# Patient Record
Sex: FEMALE | Race: WHITE | Employment: UNEMPLOYED | URBAN - METROPOLITAN AREA
[De-identification: names, ages, dates, MRNs, and addresses within clinical notes are randomized per-mention and may not be internally consistent; named-entity substitution may affect disease eponyms.]

---

## 2022-12-04 ENCOUNTER — APPOINTMENT (OUTPATIENT)
Dept: CT IMAGING | Age: 57
End: 2022-12-04
Payer: MEDICAID

## 2022-12-04 ENCOUNTER — HOSPITAL ENCOUNTER (EMERGENCY)
Age: 57
Discharge: HOME | End: 2022-12-04
Attending: EMERGENCY MEDICINE
Payer: MEDICAID

## 2022-12-04 VITALS
WEIGHT: 190 LBS | OXYGEN SATURATION: 96 % | DIASTOLIC BLOOD PRESSURE: 80 MMHG | BODY MASS INDEX: 29.82 KG/M2 | TEMPERATURE: 97.9 F | RESPIRATION RATE: 16 BRPM | HEIGHT: 67 IN | SYSTOLIC BLOOD PRESSURE: 147 MMHG | HEART RATE: 71 BPM

## 2022-12-04 DIAGNOSIS — Z85.3 HISTORY OF BREAST CANCER: ICD-10-CM

## 2022-12-04 DIAGNOSIS — S21.009A BREAST WOUND, INITIAL ENCOUNTER: Primary | ICD-10-CM

## 2022-12-04 LAB
ALBUMIN SERPL-MCNC: 4.1 G/DL (ref 3.5–5.2)
ALP BLD-CCNC: 94 U/L (ref 35–104)
ALT SERPL-CCNC: 14 U/L (ref 0–32)
ANION GAP SERPL CALCULATED.3IONS-SCNC: 13 MMOL/L (ref 7–16)
AST SERPL-CCNC: 20 U/L (ref 0–31)
BASOPHILS ABSOLUTE: 0.05 E9/L (ref 0–0.2)
BASOPHILS RELATIVE PERCENT: 0.3 % (ref 0–2)
BILIRUB SERPL-MCNC: 0.5 MG/DL (ref 0–1.2)
BUN BLDV-MCNC: 16 MG/DL (ref 6–20)
CALCIUM SERPL-MCNC: 9.2 MG/DL (ref 8.6–10.2)
CHLORIDE BLD-SCNC: 103 MMOL/L (ref 98–107)
CO2: 23 MMOL/L (ref 22–29)
CREAT SERPL-MCNC: 0.7 MG/DL (ref 0.5–1)
EOSINOPHILS ABSOLUTE: 0.04 E9/L (ref 0.05–0.5)
EOSINOPHILS RELATIVE PERCENT: 0.2 % (ref 0–6)
GFR SERPL CREATININE-BSD FRML MDRD: >60 ML/MIN/1.73
GLUCOSE BLD-MCNC: 114 MG/DL (ref 74–99)
HCT VFR BLD CALC: 45.1 % (ref 34–48)
HEMOGLOBIN: 14.6 G/DL (ref 11.5–15.5)
IMMATURE GRANULOCYTES #: 0.08 E9/L
IMMATURE GRANULOCYTES %: 0.5 % (ref 0–5)
LACTIC ACID, SEPSIS: 1.5 MMOL/L (ref 0.5–1.9)
LYMPHOCYTES ABSOLUTE: 2.32 E9/L (ref 1.5–4)
LYMPHOCYTES RELATIVE PERCENT: 14.4 % (ref 20–42)
MCH RBC QN AUTO: 31.3 PG (ref 26–35)
MCHC RBC AUTO-ENTMCNC: 32.4 % (ref 32–34.5)
MCV RBC AUTO: 96.6 FL (ref 80–99.9)
MONOCYTES ABSOLUTE: 0.7 E9/L (ref 0.1–0.95)
MONOCYTES RELATIVE PERCENT: 4.3 % (ref 2–12)
NEUTROPHILS ABSOLUTE: 12.96 E9/L (ref 1.8–7.3)
NEUTROPHILS RELATIVE PERCENT: 80.3 % (ref 43–80)
PDW BLD-RTO: 12.7 FL (ref 11.5–15)
PLATELET # BLD: 309 E9/L (ref 130–450)
PMV BLD AUTO: 10 FL (ref 7–12)
POTASSIUM SERPL-SCNC: 3.8 MMOL/L (ref 3.5–5)
RBC # BLD: 4.67 E12/L (ref 3.5–5.5)
SODIUM BLD-SCNC: 139 MMOL/L (ref 132–146)
TOTAL CK: 278 U/L (ref 20–180)
TOTAL PROTEIN: 7.4 G/DL (ref 6.4–8.3)
WBC # BLD: 16.2 E9/L (ref 4.5–11.5)

## 2022-12-04 PROCEDURE — 87077 CULTURE AEROBIC IDENTIFY: CPT

## 2022-12-04 PROCEDURE — 87186 SC STD MICRODIL/AGAR DIL: CPT

## 2022-12-04 PROCEDURE — 85025 COMPLETE CBC W/AUTO DIFF WBC: CPT

## 2022-12-04 PROCEDURE — 83605 ASSAY OF LACTIC ACID: CPT

## 2022-12-04 PROCEDURE — 36415 COLL VENOUS BLD VENIPUNCTURE: CPT

## 2022-12-04 PROCEDURE — 82550 ASSAY OF CK (CPK): CPT

## 2022-12-04 PROCEDURE — 6370000000 HC RX 637 (ALT 250 FOR IP): Performed by: EMERGENCY MEDICINE

## 2022-12-04 PROCEDURE — 87040 BLOOD CULTURE FOR BACTERIA: CPT

## 2022-12-04 PROCEDURE — 80053 COMPREHEN METABOLIC PANEL: CPT

## 2022-12-04 PROCEDURE — 87070 CULTURE OTHR SPECIMN AEROBIC: CPT

## 2022-12-04 PROCEDURE — 6360000004 HC RX CONTRAST MEDICATION: Performed by: RADIOLOGY

## 2022-12-04 PROCEDURE — 71260 CT THORAX DX C+: CPT

## 2022-12-04 PROCEDURE — 99285 EMERGENCY DEPT VISIT HI MDM: CPT

## 2022-12-04 RX ORDER — OXYCODONE HYDROCHLORIDE AND ACETAMINOPHEN 5; 325 MG/1; MG/1
1 TABLET ORAL ONCE
Status: COMPLETED | OUTPATIENT
Start: 2022-12-04 | End: 2022-12-04

## 2022-12-04 RX ORDER — KETOROLAC TROMETHAMINE 10 MG/1
10 TABLET, FILM COATED ORAL EVERY 6 HOURS PRN
Qty: 20 TABLET | Refills: 0 | Status: SHIPPED | OUTPATIENT
Start: 2022-12-04 | End: 2022-12-09

## 2022-12-04 RX ORDER — AMOXICILLIN AND CLAVULANATE POTASSIUM 875; 125 MG/1; MG/1
1 TABLET, FILM COATED ORAL 2 TIMES DAILY
Qty: 14 TABLET | Refills: 0 | Status: SHIPPED | OUTPATIENT
Start: 2022-12-04 | End: 2022-12-11

## 2022-12-04 RX ADMIN — IOPAMIDOL 75 ML: 755 INJECTION, SOLUTION INTRAVENOUS at 14:28

## 2022-12-04 RX ADMIN — OXYCODONE AND ACETAMINOPHEN 1 TABLET: 5; 325 TABLET ORAL at 12:43

## 2022-12-04 ASSESSMENT — ENCOUNTER SYMPTOMS
CHEST TIGHTNESS: 0
COLOR CHANGE: 1
SHORTNESS OF BREATH: 0

## 2022-12-04 ASSESSMENT — PAIN SCALES - GENERAL: PAINLEVEL_OUTOF10: 5

## 2022-12-04 NOTE — ED PROVIDER NOTES
82-year-old female presenting for a wound on the right breast.  She has a history of known cancer from several years ago. Initially had surgery [facility] 5 to 6 years ago. She never followed up with any oncologist she reports. About 1 year ago she started to feel another growth in that same area where she previously had surgery. She originally thought it was scar tissue and then 7 months ago noticed that there was a distinct growth occurring again. She still never sought treatment or evaluation. She normally gets care in [facility] where she has been living. Last night, she was arrested. She reports that as she was getting arrested that part of the tissue that was overgrowing the wound or mass on her right breast got pulled off during this process. It bled at that time but is no longer bleeding now. She has a distinct ulcerative growth along a wound approximately 5 x 5 cm with a depth of approximately 5 cm into her right breast.  This is a recurrent problem, persistent, moderate severity, associate with her history of cancer     No family history on file. No past surgical history on file. Review of Systems   Constitutional:  Negative for chills and fever. Respiratory:  Negative for chest tightness and shortness of breath. Cardiovascular:  Negative for chest pain. Skin:  Positive for color change and wound. All other systems reviewed and are negative. Physical Exam  Constitutional:       General: She is not in acute distress. Appearance: She is well-developed. HENT:      Head: Normocephalic and atraumatic. Eyes:      Conjunctiva/sclera: Conjunctivae normal.      Pupils: Pupils are equal, round, and reactive to light. Neck:      Thyroid: No thyromegaly. Cardiovascular:      Rate and Rhythm: Normal rate and regular rhythm. Pulmonary:      Effort: Pulmonary effort is normal. No respiratory distress. Breath sounds: Normal breath sounds.    Abdominal:      General: There is no distension. Palpations: Abdomen is soft. Tenderness: There is no abdominal tenderness. There is no guarding or rebound. Musculoskeletal:         General: No tenderness. Normal range of motion. Cervical back: Normal range of motion. Skin:     General: Skin is warm and dry. Findings: No erythema. Neurological:      Mental Status: She is alert and oriented to person, place, and time. Cranial Nerves: No cranial nerve deficit. Coordination: Coordination normal.                  Procedures     [facility]       ED Course as of 12/05/22 0040   Sun Dec 04, 2022   1645 Patient has a significant history of heroin abuse, suicide attempt with opiates and subsequent Suboxone usage. She understands to follow-up with the surgical team, the wound care center, the oncologist as well. She is asked to have local physicians instead of going back to [facility] where she used to live. She is encouraged to follow-up and get the care that she needs as these are concerning findings consistent with her known cancer. [SO]   1267 Spoke with Dr. Bella Vasquez, he will see this patient in follow-up, recommends wound care in the meantime. She will also be given oncology follow-up [SO]      ED Course User Index  [SO] Lj Vences       ED Course as of 12/05/22 0040   Sun Dec 04, 2022   1645 Patient has a significant history of heroin abuse, suicide attempt with opiates and subsequent Suboxone usage. She understands to follow-up with the surgical team, the wound care center, the oncologist as well. She is asked to have local physicians instead of going back to [facility] where she used to live. She is encouraged to follow-up and get the care that she needs as these are concerning findings consistent with her known cancer. [SO]   9579 Spoke with Dr. Bella Vasquez, he will see this patient in follow-up, recommends wound care in the meantime.   She will also be given oncology follow-up [SO]      ED Course User Index  [SO] Jasen Lynch, DO       --------------------------------------------- PAST HISTORY ---------------------------------------------  Past Medical History:  has no past medical history on file. Past Surgical History:  has no past surgical history on file. Social History:      Family History: family history is not on file. The patients home medications have been reviewed.     Allergies: Morphine    -------------------------------------------------- RESULTS -------------------------------------------------  Labs:  Results for orders placed or performed during the hospital encounter of 12/04/22   CBC with Auto Differential   Result Value Ref Range    WBC 16.2 (H) 4.5 - 11.5 E9/L    RBC 4.67 3.50 - 5.50 E12/L    Hemoglobin 14.6 11.5 - 15.5 g/dL    Hematocrit 45.1 34.0 - 48.0 %    MCV 96.6 80.0 - 99.9 fL    MCH 31.3 26.0 - 35.0 pg    MCHC 32.4 32.0 - 34.5 %    RDW 12.7 11.5 - 15.0 fL    Platelets 932 045 - 795 E9/L    MPV 10.0 7.0 - 12.0 fL    Neutrophils % 80.3 (H) 43.0 - 80.0 %    Immature Granulocytes % 0.5 0.0 - 5.0 %    Lymphocytes % 14.4 (L) 20.0 - 42.0 %    Monocytes % 4.3 2.0 - 12.0 %    Eosinophils % 0.2 0.0 - 6.0 %    Basophils % 0.3 0.0 - 2.0 %    Neutrophils Absolute 12.96 (H) 1.80 - 7.30 E9/L    Immature Granulocytes # 0.08 E9/L    Lymphocytes Absolute 2.32 1.50 - 4.00 E9/L    Monocytes Absolute 0.70 0.10 - 0.95 E9/L    Eosinophils Absolute 0.04 (L) 0.05 - 0.50 E9/L    Basophils Absolute 0.05 0.00 - 0.20 E9/L   Comprehensive Metabolic Panel   Result Value Ref Range    Sodium 139 132 - 146 mmol/L    Potassium 3.8 3.5 - 5.0 mmol/L    Chloride 103 98 - 107 mmol/L    CO2 23 22 - 29 mmol/L    Anion Gap 13 7 - 16 mmol/L    Glucose 114 (H) 74 - 99 mg/dL    BUN 16 6 - 20 mg/dL    Creatinine 0.7 0.5 - 1.0 mg/dL    Est, Glom Filt Rate >60 >=60 mL/min/1.73    Calcium 9.2 8.6 - 10.2 mg/dL    Total Protein 7.4 6.4 - 8.3 g/dL    Albumin 4.1 3.5 - 5.2 g/dL    Total Bilirubin 0.5 0.0 - 1.2 mg/dL    Alkaline Phosphatase 94 35 - 104 U/L    ALT 14 0 - 32 U/L    AST 20 0 - 31 U/L   CK   Result Value Ref Range    Total  (H) 20 - 180 U/L   Lactate, Sepsis   Result Value Ref Range    Lactic Acid, Sepsis 1.5 0.5 - 1.9 mmol/L       Radiology:  CT CHEST W CONTRAST   Final Result   1. Right breast 4 x 3 cm fungating, ulcerative type lesion which is gas   filled. Please see details above. No lymphadenopathy identified. 2.  Centrilobular emphysema. No metastatic lung disease or acute chest   disease.             ------------------------- NURSING NOTES AND VITALS REVIEWED ---------------------------  Date / Time Roomed:  12/4/2022 11:49 AM  ED Bed Assignment:  12/12    The nursing notes within the ED encounter and vital signs as below have been reviewed. BP (!) 147/80   Pulse 71   Temp 97.9 °F (36.6 °C) (Infrared)   Resp 16   Ht 5' 7\" (1.702 m)   Wt 190 lb (86.2 kg)   SpO2 96%   BMI 29.76 kg/m²   Oxygen Saturation Interpretation: Normal      ------------------------------------------ PROGRESS NOTES ------------------------------------------  I have spoken with the patient and discussed todays results, in addition to providing specific details for the plan of care and counseling regarding the diagnosis and prognosis. Their questions are answered at this time and they are agreeable with the plan. I discussed at length with them reasons for immediate return here for re evaluation. They will followup with primary care by calling their office tomorrow. --------------------------------- ADDITIONAL PROVIDER NOTES ---------------------------------  At this time the patient is without objective evidence of an acute process requiring hospitalization or inpatient management. They have remained hemodynamically stable throughout their entire ED visit and are stable for discharge with outpatient follow-up.      The plan has been discussed in detail and they are aware of the specific conditions for emergent return, as well as the importance of follow-up. Discharge Medication List as of 12/4/2022  5:00 PM        START taking these medications    Details   amoxicillin-clavulanate (AUGMENTIN) 875-125 MG per tablet Take 1 tablet by mouth 2 times daily for 7 days, Disp-14 tablet, R-0Normal      ketorolac (TORADOL) 10 MG tablet Take 1 tablet by mouth every 6 hours as needed for Pain, Disp-20 tablet, R-0Normal             Diagnosis:  1. Breast wound, initial encounter    2. History of breast cancer        Disposition:  Patient's disposition: Discharge to home  Patient's condition is stable.          Isaias Ferguson DO  12/05/22 0041

## 2022-12-04 NOTE — ED NOTES
Iv established and labs drawn/sent, medicated per orders, no periods since 41years of age, to have ct after labs     Genevieve Oh RN  12/04/22 9837

## 2022-12-04 NOTE — ED TRIAGE NOTES
Department of Emergency Medicine  FIRST PROVIDER TRIAGE NOTE             Independent MLP           12/4/22  11:43 AM EST    Date of Encounter: 12/4/22   MRN: [id number]      HPI: Cadence Ferrell is a 56 y.o. female who presents to the ED for Wound Check (Pt has tumor on her right breast that she states is open. Pt states she had a dressing on her wound that was ripped off last night causing it to open and bleed.)       ROS: Negative for cp, sob, abd pain, back pain, fever, cough, vomiting, diarrhea, urinary complaints, headache, or dizziness. PE: Gen Appearance/Constitutional: alert  CV: regular rate  Pulm: CTA bilat  GI: soft and NT     Initial Plan of Care: All treatment areas with department are currently occupied. Plan to order/Initiate the following while awaiting opening in ED: labs and imaging studies.   Initiate Treatment-Testing, Proceed toTreatment Area When Bed Available for ED Attending/MLP to Continue Care    Electronically signed by RHONDA Cabrera   DD: 12/4/22

## 2022-12-08 LAB
ORGANISM: ABNORMAL
WOUND/ABSCESS: ABNORMAL
WOUND/ABSCESS: ABNORMAL

## 2022-12-09 LAB
BLOOD CULTURE, ROUTINE: NORMAL
CULTURE, BLOOD 2: NORMAL

## 2022-12-09 RX ORDER — CIPROFLOXACIN 500 MG/1
500 TABLET, FILM COATED ORAL 2 TIMES DAILY
Qty: 14 TABLET | Refills: 0 | Status: SHIPPED | OUTPATIENT
Start: 2022-12-09 | End: 2022-12-16

## 2022-12-09 NOTE — PROGRESS NOTES
Reviewed patients after hours culture results. Wound culture growing Pseudomonas, patient discharged on Augmentin.      Discussed with Dr. Jessica Mares and spoke with patient - Rx for ciprofloxacin sent to [facility] in [place] per patient's request.    Brett Lares, FadumoD, BCPS 12/9/2022 1:19 PM   [phone]

## 2023-11-12 ENCOUNTER — HOSPITAL ENCOUNTER (EMERGENCY)
Age: 58
Discharge: HOME | End: 2023-11-12
Attending: EMERGENCY MEDICINE
Payer: MEDICAID

## 2023-11-12 VITALS
BODY MASS INDEX: 29.82 KG/M2 | HEART RATE: 92 BPM | DIASTOLIC BLOOD PRESSURE: 72 MMHG | RESPIRATION RATE: 18 BRPM | OXYGEN SATURATION: 98 % | WEIGHT: 190 LBS | SYSTOLIC BLOOD PRESSURE: 123 MMHG | TEMPERATURE: 98 F | HEIGHT: 67 IN

## 2023-11-12 DIAGNOSIS — Z51.89 VISIT FOR WOUND CHECK: Primary | ICD-10-CM

## 2023-11-12 PROCEDURE — 99283 EMERGENCY DEPT VISIT LOW MDM: CPT

## 2023-11-12 ASSESSMENT — PAIN - FUNCTIONAL ASSESSMENT: PAIN_FUNCTIONAL_ASSESSMENT: NONE - DENIES PAIN

## 2023-11-12 ASSESSMENT — LIFESTYLE VARIABLES
HOW OFTEN DO YOU HAVE A DRINK CONTAINING ALCOHOL: NEVER
HOW MANY STANDARD DRINKS CONTAINING ALCOHOL DO YOU HAVE ON A TYPICAL DAY: PATIENT DOES NOT DRINK

## 2023-11-12 NOTE — ED PROVIDER NOTES
[address]        Pt Name: Nii Zamudio  MRN: [id number]  [address] [facility] [place] 1965  Date of evaluation: 11/12/2023  Provider: Yolanda Anglin DO  PCP: No primary care provider on file. Note Started: 1:16 AM EST 11/12/23    CHIEF COMPLAINT       Chief Complaint   Patient presents with    Wound Infection     Pt states that she was at the halfway and they didn't want to keep her due to a wound on her right breast she states is from her CA. Pt states 6 days ago she was dx with blood clots in her lungs and given eliquis that she has been taking. HISTORY OF PRESENT ILLNESS: 1 or more Elements   History From: Patient      Nii Zamudio is a 66 y.o. female who presents to the emergency department for wound check. Patient states that she follows with oncology for breast cancer. Patient scheduled for mastectomy on the 21st.  Patient is currently on Cipro and Flagyl for her wound infection. Patient states the wound has been there for a year. Patient states that she was picked up for a warrant tonight when they wanted her to come to the emergency department for a wound and to have her medications. Patient was diagnosed with a PE recently and is on Eliquis. Patient denies any chest pain at this time. Patient denies any fever, chills, nausea, vomiting, chest pain, abdominal pain, leg pain, leg swelling  Patient confirms chronic wound   Review of Systems   Constitutional:  Negative for chills and fever. HENT:  Negative for congestion and sore throat. Respiratory:  Negative for cough and shortness of breath. Cardiovascular:  Negative for chest pain. Gastrointestinal:  Negative for constipation, diarrhea, nausea and vomiting. Genitourinary:  Negative for dysuria. Musculoskeletal:  Negative for back pain. Skin:  Positive for wound (Chronic). Neurological:  Negative for headaches.          Nursing Notes were all reviewed and

## 2023-11-12 NOTE — DISCHARGE INSTRUCTIONS
Please continue antibiotics as prescribed. Please return the emergency department for develop any new or worsening symptoms.

## 2023-11-12 NOTE — ED NOTES
Pt given d/c instructions and was able to verbalize understanding. Pt ambulatory out of department.       Dandre Kidd RN  11/12/23 5380

## 2023-11-13 ASSESSMENT — ENCOUNTER SYMPTOMS
VOMITING: 0
SORE THROAT: 0
CONSTIPATION: 0
SHORTNESS OF BREATH: 0
NAUSEA: 0
COUGH: 0
DIARRHEA: 0
BACK PAIN: 0

## 2024-04-30 ENCOUNTER — OFFICE VISIT (OUTPATIENT)
Dept: HEMATOLOGY/ONCOLOGY | Facility: HOSPITAL | Age: 59
End: 2024-04-30
Payer: MEDICAID

## 2024-04-30 VITALS
RESPIRATION RATE: 20 BRPM | OXYGEN SATURATION: 98 % | WEIGHT: 172.62 LBS | SYSTOLIC BLOOD PRESSURE: 142 MMHG | HEIGHT: 66 IN | TEMPERATURE: 97.3 F | BODY MASS INDEX: 27.74 KG/M2 | HEART RATE: 99 BPM | DIASTOLIC BLOOD PRESSURE: 83 MMHG

## 2024-04-30 DIAGNOSIS — C50.919 MALIGNANT NEOPLASM OF BREAST IN FEMALE, ESTROGEN RECEPTOR NEGATIVE, UNSPECIFIED LATERALITY, UNSPECIFIED SITE OF BREAST (MULTI): Primary | ICD-10-CM

## 2024-04-30 DIAGNOSIS — Z17.1 MALIGNANT NEOPLASM OF BREAST IN FEMALE, ESTROGEN RECEPTOR NEGATIVE, UNSPECIFIED LATERALITY, UNSPECIFIED SITE OF BREAST (MULTI): Primary | ICD-10-CM

## 2024-04-30 PROBLEM — C50.811 MALIGNANT NEOPLASM OF OVERLAPPING SITES OF RIGHT BREAST IN FEMALE, ESTROGEN RECEPTOR NEGATIVE (MULTI): Status: ACTIVE | Noted: 2024-04-30

## 2024-04-30 PROCEDURE — 99215 OFFICE O/P EST HI 40 MIN: CPT | Performed by: INTERNAL MEDICINE

## 2024-04-30 PROCEDURE — 99205 OFFICE O/P NEW HI 60 MIN: CPT | Performed by: INTERNAL MEDICINE

## 2024-04-30 ASSESSMENT — PAIN SCALES - GENERAL: PAINLEVEL: 6

## 2024-04-30 NOTE — PROGRESS NOTES
Patient Visit Information:   Date of Service: 4/30/2024   Referring Provider:  Dr. Bartolome Marie   Visit Type: New Visit      Cancer History:          Breast         AJCC Edition: 8th (AJCC), Diagnosis Date: 5/25/2022               Stage IIIC (cT4b, cN0, cM0, G3, ER-, OK-, HER2-)       Oncologic History and Treatment Synopsis:      59 y.o. postmenopausal  female with right-sided invasive ductal carcinoma, PDL-1 Negative Triple Negative, clinical stage IIIC (cT4b cN0 M0).  The patient's breast cancer was diagnosed on May 25, 2023, and is grade 3, estrogen receptor negative at <1%, progesterone receptor negative at 1%, and HER-2/sumi negative.    Patient was previously diagnosed and treated for ispilateral right sided her-positive breast cancer, stage IIA (T2N0M0).     Details of her oncologic history are below:      ONCOLOGIC HISTORY:  Malignant neoplasm of overlapping sites of right breast in female, estrogen receptor negative (Multi), Clinical: Stage IIIC (cT4b, cN0, cM0, G3, ER-, OK-, HER2-)  PDL-1 Negative with CPS Score =0    10/9/2017: Patient underwent a a right US-guided core biopsy. Pathology was consistent with invasive ductal carcinoma ER/OK negative Her2 undetermined  10/16/2017: Patient underwent a right partial mastectomy with SLNB. Pathology was consistent with invasive ductal carcinoma, grade 3 with invasive ductal carcinoma extending focally to the anterior inked  margin, DCIS was present, high grade, solid and comedo types, 0/2 lymph nodes negative, estrogen receptor <1%, progesterone receptor 1% and her2 (3+), stage pT2N0.  Received TCH x 6 but did not complete maintenance Herceptin   Received 13/33 planned radiation treatments  4/2022: Patient presented with right breast mass. Had apparently been present for several months prior  5/25/2022: Patient underwent a core needle biopsy of right breast. This revealed invasive, ductal carcinoma , ER/OK/HER2 negative  Developed ulceration (8 x  "10 x 4cm) in right breast  03/07/2023: PET scan showed FDG uptake (SUV 14.5) in open wound, diffuse edema of right breast, no abnormal axillary or supraclavicular adenopathy,   3/15/2023: Initiated on Carbo/Taxol D1, D8, D15 with D22 off per 28 Day cycle. Last dose received 11/21/2023 11/4/2023: Patient underwent CT of the CAP. This revealed bilateral pulmonary emboli. There was a large necrotic mass in the right breast with skin thickening and inflammatory changes worse than on prior PET scan of 03/07/2023. There was fatty infiltration of the liver  12/28/2023: Patient was initiated on Carboplatin and Gemcitabine, D1, D8 with D15 off.   02/03/2024: PDL-1 test resulted with CPS Score =0  3/27/2024: Bilateral MRI of Breast. Per report this revealed a 3.5 cm large necrotic mass with large open wound. Thickened enhancing skin mos likely  with malignant infiltration and retracted right pectoralis muscle  with malignant extension. There were a few abnormal 1-2 cm right axillary Lns and an abnormal right internal mammary lymph node. Left breast did not reveal any evidence of malignancy     History of Present Illness: Patient ID: Mariela De Guzman \"Mona\" is a 59 y.o. female.        Chief Complaint: Here for to seek surgical consultation for locally advanced right sided TNBC.      Interval History:    Ms. Mariela De Guzman is a 59 y.o. postmenopausal  female with right-sided invasive ductal carcinoma, Triple Negative, clinical stage IIIC (cT4b cN0 M0).  The patient's breast cancer was diagnosed on May 25, 2023, and is grade 3, estrogen receptor negative at <1%, progesterone receptor negative at 1%, and HER-2/sumi negative, PDL-1 negative.    Patient was previously diagnosed and treated for ispilateral right sided her2-positive breast cancer, stage IIA (T2N0M0).      Details of her oncologic history are detailed above. She is currently under the care of Dr. Bartolome Marie, [facility],  and is currently " undergoing neoadjuvant chemotherapy with Carboplatin and Gemcitabine, initiated on 12/28/2023 and last recieved about 3-4 weeks ago.    She comes in looking for a surgical consultation. Per patient, in her estimation, she has responded well to treatment and she would like to proceed with surgery. This is consistent with oncologist's assessment in medical records which states has had a good response with 50-60% improvement in breast mass after initiation of Carbo Ray with no evidence of distant metastatic disease.      Per her medical records,  in discussion with a local surgical oncologist surgical options were weighed and after much deliberation  including tumor board discussion a referral to  for surgical management was thought to be the most appropriate given muscle involvement.     Patient reports pain in right breast. Per accompanying medical records patients oncologist recommended referral to palliative care with concern of medication abuse due to negative testing for oxycodone in urine despite prescriptions.       Review of Systems:       A 14-point review of system was completed and was negative except for what is noted in HPI.        Allergies and Intolerances:       Allergies:   Allergies   Allergen Reactions    Morphine Anaphylaxis                 Outpatient Medication Profile:     Eliquis 5 mg BID  Ocycodone 10 po q4 prn     Medical History:  Pulmonary Emboli, breast cancer  Surgical History:  Right partial mastectomy        Family History:  See below    Social Substance History:   Social History     Socioeconomic History    Marital status: Significant Other     Spouse name: Not on file    Number of children: Not on file    Years of education: Not on file    Highest education level: Not on file   Occupational History    Not on file   Tobacco Use    Smoking status: Every Day     Average packs/day: 1 pack/day for 39.0 years (39.0 ttl pk-yrs)     Types: Cigarettes     Start date: 1985     Passive  "exposure: Current    Smokeless tobacco: Never   Substance and Sexual Activity    Alcohol use: Not on file    Drug use: Not on file    Sexual activity: Not on file   Other Topics Concern    Not on file   Social History Narrative    Not on file     Social Determinants of Health     Financial Resource Strain: Not on file   Food Insecurity: Not on file   Transportation Needs: Not on file   Physical Activity: Not on file   Stress: Not on file   Social Connections: Not on file   Intimate Partner Violence: Not on file   Housing Stability: Not on file      Additional Social History:     Breast Cancer Risk Factors:     Family History:  Mother had breast cancer at the age of 40, maternal grandmother at the age of 40 and paternal grandmother at the age of 60 years. Per records patient reported being a BRCA carrier but testing had been done with results pending.     OBJECTIVE:    VS / Pain:  /83   Pulse 99   Temp 36.3 °C (97.3 °F) (Temporal)   Resp 20   Ht (S) 1.67 m (5' 5.75\")   Wt 78.3 kg (172 lb 9.9 oz)   SpO2 98%   BMI 28.08 kg/m²   BSA: 1.91 meters squared   Pain Scale: 3    The ECOG performance scale today is Asymptomatic    Physical Exam:      Constitutional: Well developed, awake/alert/oriented  x3, no distress, alert and cooperative   Eyes: PERRL, EOMI, clear sclera   ENMT: mucous membranes moist, no apparent injury,  no lesions seen   Head/Neck: Neck supple, no apparent injury, thyroid  without mass or tenderness, No JVD, trachea midline, no bruits   Respiratory/Thorax: Patent airways, CTAB, normal  breath sounds with good chest expansion, thorax symmetric   Cardiovascular: Regular, rate and rhythm, no murmurs,  2+ equal pulses of the extremities, normal S 1and S 2   Gastrointestinal: Nondistended, soft, non-tender,  no rebound tenderness or guarding, no masses palpable, no organomegaly, +BS, no bruits   Musculoskeletal: ROM intact, no joint swelling, normal  strength   Extremities: Left upper extremity " with hyperpigmentation  tracking along a vein starting in antecubital fossa   Neurological: alert and oriented x3, intact senses,  motor, response and reflexes, normal strength   Breast: Large ulcerative lesion with rolled edges measuring approximately 7 x 3 cm in right breast.  No palpable mass in the left breast. No palpable axillary or supraclavicular lymphadenopathies bilaterally. No swelling of either upper extremity which had free range of motion.   Lymphatic: No significant lymphadenopathy   Psychological: Appropriate mood and behavior   Skin: Warm and dry, no lesions, no rashes                 Assessment and Plan:   Assessment  Ms. Mariela De Guzman is a 59 y.o. postmenopausal  female with right-sided invasive ductal carcinoma, Triple Negative, clinical stage IIIC (cT4b cN0 M0).  The patient's breast cancer was diagnosed on May 25, 2023, and is grade 3, estrogen receptor negative at <1%, progesterone receptor negative at 1%, and HER-2/sumi negative, PDL-1 negative.    Patient was previously diagnosed and treated for ispilateral right sided her2-positive breast cancer, stage IIA (T2N0M0).     She is currently under the care of Dr. Bartolome Marie, [facility],  and is currently undergoing neoadjuvant chemotherapy with Carboplatin and Gemcitabine, initiated on 12/28/2023 and last recieved about 3-4 weeks ago.    She comes in looking for a surgical consultation.     Plan    Refer surgical Oncology. Patient was briefly discussed with Dr. Jennyfer BRIAN who will see patient castro week  Obtain additional Medical records to include recent scans and path slides for review  RTC after surgical consult for additional medical oncology recommendations

## 2024-05-06 ENCOUNTER — OFFICE VISIT (OUTPATIENT)
Dept: SURGICAL ONCOLOGY | Facility: CLINIC | Age: 59
End: 2024-05-06
Payer: MEDICAID

## 2024-05-06 VITALS
SYSTOLIC BLOOD PRESSURE: 138 MMHG | DIASTOLIC BLOOD PRESSURE: 83 MMHG | HEART RATE: 85 BPM | BODY MASS INDEX: 28.63 KG/M2 | WEIGHT: 176 LBS

## 2024-05-06 DIAGNOSIS — Z17.1 MALIGNANT NEOPLASM OF BREAST IN FEMALE, ESTROGEN RECEPTOR NEGATIVE, UNSPECIFIED LATERALITY, UNSPECIFIED SITE OF BREAST (MULTI): ICD-10-CM

## 2024-05-06 DIAGNOSIS — C50.919 MALIGNANT NEOPLASM OF BREAST IN FEMALE, ESTROGEN RECEPTOR NEGATIVE, UNSPECIFIED LATERALITY, UNSPECIFIED SITE OF BREAST (MULTI): ICD-10-CM

## 2024-05-06 PROCEDURE — 99215 OFFICE O/P EST HI 40 MIN: CPT | Performed by: SURGERY

## 2024-05-06 PROCEDURE — 99205 OFFICE O/P NEW HI 60 MIN: CPT | Performed by: SURGERY

## 2024-05-06 RX ORDER — METRONIDAZOLE 500 MG/1
500 TABLET ORAL EVERY 8 HOURS
COMMUNITY
Start: 2024-01-11

## 2024-05-06 ASSESSMENT — PAIN SCALES - GENERAL: PAINLEVEL: 9

## 2024-05-06 NOTE — PROGRESS NOTES
Chief Complaint  Locally advanced, recurrent right breast cancer    History of Present Illness    Referring Provider: Jabari    59 year-old non-Ashkenazi Amish,  female here with locally advanced recurrent right breast cancer, triple neg  cT4 cNx   Here alone       History per chart. No images available:  1) 10/2017 right breast bx. IDC Er/MD neg. Her2 ?  2) 10/2017 right PM/SLNB ([place]) pT2 pN0 IDC. + anterior margin 0/2 Lns. ER <1% MD 1% Her2 positive  3) TCH x 2 but did not complete herceptin x 1 year  4) some XRT ( treatments)  5) 2022 right breast mass x a few months  6) 2022 right breast bx ([place]) IDC ER/MD/Her2 neg  7) wound ulceration 8 x 10 x 4 cm   8) 3/2023 PET scan abnormal uptake in a large right breast mass.  No distant disease  9) 3/2023 started on carbo/taxol.    10) 2023 diagnosed with PE on CT scan. Worsening right breast mass  11) changed to carbo/gemcitabine  12) 3/29/2024.  Bilateral breast MRI.  Left breast and axilla negative.  Right breast 3.5 cm necrotic appearing right breast mass with large open wound.  Thickened enhancing skin most consistent with malignant involvement.  Retracted right pectoralis muscle with malignant extension.  Right axilla with a few abnormal right axillary lymph nodes.  Abnormal 5 mm right internal mammary lymph node.  BI-RADS 6  13) pt reports negative genetic testing  14) med onc consultation with Dr Barboza    She presents today for further management and surgical discussion.    Ob/gyn history:  Menarche 14  Menopause 43  , age of first delivery 27  10 years OCPs, no fertility treatments, no HRT    No family history of breast or ovarian cancer.     Review of systems  A comprehensive ROS was taken on the patient intake form and reviewed with the patient. This form is scanned into the electronic medical record.    Constitutional symptoms: Denies generalized fatigue. Denies weight change, fevers/chills, difficulty sleeping   Eyes:  Denies double vision, glaucoma, cataracts.  Ear/nose/throat/mouth: Denies hearing changes, sore throat, sinus problems.  Cardiovascular: No chest pain. Denies irregular heartbeat. Denies ankle swelling.  Respiratory: No wheezing, cough, or shortness of breath.  Gastrointestinal: No abdominal pain, No nausea/vomiting. No indigestion/heartburn. No change in bowel habits. No constipation or diarrhea.   Genitourinary: No urinary incontinence. No urinary frequency. No painful urination.  Musculoskeletal: No bone pain, no muscle pain, no joint pain.   Integumentary: No rash. No masses. No changes in moles. No easy bruising.  Neurological: No headaches. No tremors. No numbness/tingling.  Psychiatric: No anxiety. No depression.  Endocrine: No excessive thirst. Not too hot or too cold. Not tired or fatigued.   Hematological/lymphatic: No swollen glands or blood clotting problems. No bruising.     Physical exam  A chaperone was offered for all portions of the physical exam. The patient declined.     General appearance: appears stated age, alert and oriented x 3  Head: Normocephalic, atraumatic  Eyes: conjunctivae/corneas clear.  Ears: External ears are normal, hearing is grossly intact.  Lungs: normal breathing  Heart: regular   Abdomen: Soft, nontender, nondistended.  Neurologic: grossly normal  Lymph nodes: No cervical, supraclavicular or axillary lymphadenopathy bilaterally    Breast: A comprehensive breast exam was performed in the seated and supine positions. Breasts are symmetrical. Bilateral nipples are everted. There are no skin changes on arm maneuvers. Bra size: B   Right: photo uploaded.  Just superior and lateral to the nipple there is a 6 x 4 cm open wound.  This area is fixed to the chest wall .     Left: no masses     Results  Imaging  All breast imaging personally reviewed by me.  See HPI    Pathology  5/25/2022 right breast bx ([place]) IDC ER/NC/Her2 neg    Impression:   1) 59 year-old non-Ashkenazi [employer],   female here with locally advanced recurrent right breast cancer, triple neg  2) Co-morbidities include pulmonary emboli but not on blood thinners. H/o heroin use. Current tobacco use  3) FH of breast cancer. Pt reports negative genetic testing      Plan:   She is here alone.  Her course has been complicated and her medical records are incomplete.  It sounds like she has a locally recurrent locally advanced right breast cancer, triple negative with good response to systemic therapy.  Recent breast MRI suggests involvement of the muscle.  Exam today confirms a fungating area on her right breast that appears fixed to the chest wall.  There may be additional benefit to continued systemic therapy and optimizing surgical resection.  Surgical plan would be right modified radical mastectomy without reconstruction.  She did not complete her initial radiation treatment so there could be additional radiation recommended after surgery.  I will review with Dr. Brennen Mcfadden.  Will continue to obtain medical records.  She might need repeat imaging prior to surgery.  She should call with any sooner concerns.

## 2024-05-08 ENCOUNTER — APPOINTMENT (OUTPATIENT)
Dept: HEMATOLOGY/ONCOLOGY | Facility: HOSPITAL | Age: 59
End: 2024-05-08
Payer: MEDICAID

## 2024-05-08 DIAGNOSIS — C50.919 MALIGNANT NEOPLASM OF BREAST IN FEMALE, ESTROGEN RECEPTOR NEGATIVE, UNSPECIFIED LATERALITY, UNSPECIFIED SITE OF BREAST (MULTI): ICD-10-CM

## 2024-05-08 DIAGNOSIS — Z17.1 MALIGNANT NEOPLASM OF BREAST IN FEMALE, ESTROGEN RECEPTOR NEGATIVE, UNSPECIFIED LATERALITY, UNSPECIFIED SITE OF BREAST (MULTI): ICD-10-CM

## 2024-05-16 ENCOUNTER — HOSPITAL ENCOUNTER (OUTPATIENT)
Dept: RADIOLOGY | Facility: EXTERNAL LOCATION | Age: 59
Discharge: HOME | End: 2024-05-16
Payer: MEDICAID

## 2024-05-21 ENCOUNTER — APPOINTMENT (OUTPATIENT)
Dept: HEMATOLOGY/ONCOLOGY | Facility: HOSPITAL | Age: 59
End: 2024-05-21
Payer: MEDICAID

## 2024-05-21 ENCOUNTER — TELEPHONE (OUTPATIENT)
Dept: SCHEDULING | Age: 59
End: 2024-05-21
Payer: MEDICAID

## 2024-05-21 NOTE — TELEPHONE ENCOUNTER
I called patient to inform her that her appt has been rescheduled to 9 am tomorrow 5/22 per her request. Patient's VM was full - unable to leave message. Steffi Waters RN

## 2024-05-21 NOTE — TELEPHONE ENCOUNTER
Pt is hoping to be seen tomorrow around 9 or 9:30 pm instead of today. I see those slots are saved for NPVs, is it okay to use for FUV? Thank you!

## 2024-05-22 ENCOUNTER — APPOINTMENT (OUTPATIENT)
Dept: HEMATOLOGY/ONCOLOGY | Facility: HOSPITAL | Age: 59
End: 2024-05-22
Payer: MEDICAID

## 2024-05-28 ENCOUNTER — HOSPITAL ENCOUNTER (OUTPATIENT)
Dept: RADIOLOGY | Facility: EXTERNAL LOCATION | Age: 59
Discharge: HOME | End: 2024-05-28

## 2024-05-28 ENCOUNTER — OFFICE VISIT (OUTPATIENT)
Dept: HEMATOLOGY/ONCOLOGY | Facility: HOSPITAL | Age: 59
End: 2024-05-28
Payer: MEDICAID

## 2024-05-28 VITALS
TEMPERATURE: 97.2 F | RESPIRATION RATE: 20 BRPM | SYSTOLIC BLOOD PRESSURE: 149 MMHG | HEART RATE: 78 BPM | WEIGHT: 178.57 LBS | DIASTOLIC BLOOD PRESSURE: 85 MMHG | BODY MASS INDEX: 28.7 KG/M2 | HEIGHT: 66 IN | OXYGEN SATURATION: 98 %

## 2024-05-28 DIAGNOSIS — Z17.1 MALIGNANT NEOPLASM OF OVERLAPPING SITES OF RIGHT BREAST IN FEMALE, ESTROGEN RECEPTOR NEGATIVE (MULTI): Primary | ICD-10-CM

## 2024-05-28 DIAGNOSIS — C50.919 MALIGNANT NEOPLASM OF BREAST IN FEMALE, ESTROGEN RECEPTOR NEGATIVE, UNSPECIFIED LATERALITY, UNSPECIFIED SITE OF BREAST (MULTI): ICD-10-CM

## 2024-05-28 DIAGNOSIS — C50.811 MALIGNANT NEOPLASM OF OVERLAPPING SITES OF RIGHT BREAST IN FEMALE, ESTROGEN RECEPTOR NEGATIVE (MULTI): Primary | ICD-10-CM

## 2024-05-28 DIAGNOSIS — Z17.1 MALIGNANT NEOPLASM OF BREAST IN FEMALE, ESTROGEN RECEPTOR NEGATIVE, UNSPECIFIED LATERALITY, UNSPECIFIED SITE OF BREAST (MULTI): ICD-10-CM

## 2024-05-28 PROCEDURE — 99215 OFFICE O/P EST HI 40 MIN: CPT | Performed by: INTERNAL MEDICINE

## 2024-05-28 RX ORDER — CIPROFLOXACIN 500 MG/1
500 TABLET ORAL 3 TIMES DAILY
COMMUNITY

## 2024-05-28 RX ORDER — ONDANSETRON HYDROCHLORIDE 8 MG/1
8 TABLET, FILM COATED ORAL EVERY 8 HOURS PRN
Qty: 30 TABLET | Refills: 5 | Status: SHIPPED | OUTPATIENT
Start: 2024-05-28

## 2024-05-28 RX ORDER — PROCHLORPERAZINE MALEATE 10 MG
10 TABLET ORAL EVERY 6 HOURS PRN
Qty: 30 TABLET | Refills: 5 | Status: SHIPPED | OUTPATIENT
Start: 2024-05-28

## 2024-05-28 ASSESSMENT — PAIN SCALES - GENERAL: PAINLEVEL: 8

## 2024-05-28 NOTE — PROGRESS NOTES
Expand All Collapse All    Patient Visit Information:   Date of Service: 4/30/2024   Referring Provider:  Dr. Bartolome Marie   Visit Type: New Visit      Cancer History:          Breast         AJCC Edition: 8th (AJCC), Diagnosis Date: 5/25/2022               Stage IIIC (cT4b, cN0, cM0, G3, ER-, WV-, HER2-)         Oncologic History and Treatment Synopsis:       59 y.o. postmenopausal  female with right-sided invasive ductal carcinoma, PDL-1 Negative Triple Negative, clinical stage IIIC (cT4b cN0 M0).  The patient's breast cancer was diagnosed on May 25, 2023, and is grade 3, estrogen receptor negative at <1%, progesterone receptor negative at 1%, and HER-2/sumi negative.     Patient was previously diagnosed and treated for ispilateral right sided her-positive breast cancer, stage IIA (T2N0M0).      Details of her oncologic history are below:      ONCOLOGIC HISTORY:  Malignant neoplasm of overlapping sites of right breast in female, estrogen receptor negative ([place]), Clinical: Stage IIIC (cT4b, cN0, cM0, G3, ER-, WV-, HER2-)  PDL-1 Negative with CPS Score =0     10/9/2017: Patient underwent a a right US-guided core biopsy. Pathology was consistent with invasive ductal carcinoma ER/WV negative Her2 undetermined  10/16/2017: Patient underwent a right partial mastectomy with SLNB. Pathology was consistent with invasive ductal carcinoma, grade 3 with invasive ductal carcinoma extending focally to the anterior inked  margin, DCIS was present, high grade, solid and comedo types, 0/2 lymph nodes negative, estrogen receptor <1%, progesterone receptor 1% and her2 (3+), stage pT2N0.  Received TCH x 6 but did not complete maintenance Herceptin   Received 13/33 planned radiation treatments  4/2022: Patient presented with right breast mass. Had apparently been present for several months prior  5/25/2022: Patient underwent a core needle biopsy of right breast. This revealed invasive, ductal carcinoma , ER/WV/HER2  "negative  Developed ulceration (8 x 10 x 4cm) in right breast  03/07/2023: PET scan showed FDG uptake (SUV 14.5) in open wound, diffuse edema of right breast, no abnormal axillary or supraclavicular adenopathy,   3/15/2023: Initiated on Carbo/Taxol D1, D8, D15 with D22 off per 28 Day cycle. Last dose received 11/21/2023 11/4/2023: Patient underwent CT of the CAP. This revealed bilateral pulmonary emboli. There was a large necrotic mass in the right breast with skin thickening and inflammatory changes worse than on prior PET scan of 03/07/2023. There was fatty infiltration of the liver  11/21/2023: Last dose of Carbo/Taxol  12/28/2023: Patient was initiated on Carboplatin and Gemcitabine, D1, D8 with D15 off.   02/03/2024: PDL-1 test resulted with CPS Score =0  2/22/2024: PET scan showed abnirmal uptake in a large mass in the lateral aspect of the right breast with extension into the overlying muscle (SUV 3.1; Previously SUV was 14.3). No abnormal supraclavicular or axillary Lymph nodes  3/27/2024: Bilateral MRI of Breast. Per report this revealed a 3.5 cm large necrotic mass with large open wound. Thickened enhancing skin most likely  with malignant infiltration and retracted right pectoralis muscle  with malignant extension. There were a few abnormal 1-2 cm right axillary Lns and an abnormal right internal mammary lymph node. Left breast did not reveal any evidence of malignancy     History of Present Illness: Patient ID: Mariela De Guzman \"Mona\" is a 59 y.o. female.        Chief Complaint: Here to finalize her treatment plan for locally advanced right sided TNBC with a large fungating ulcer      Interval History:    Ms. Mariela De Guzman is a 59 y.o. postmenopausal  female with locally advanced right-sided invasive ductal carcinoma with a fungating ulcer, Triple Negative in nature, clinical stage IIIC (cT4b cN0 M0).  The patient's breast cancer was diagnosed on May 25, 2023, and is grade 3, estrogen receptor " negative at <1%, progesterone receptor negative at 1%, and HER-2/sumi negative, PDL-1 negative.     She was previously  under the care of Dr. Bartolome Marie, [facility],  and was undergoing neoadjuvant chemotherapy with Carboplatin and Gemcitabine, initiated on 12/28/2023 and, last received March 29, 2024.    She was first seen on 4/30/2024 for transfer of care. She requested a surgical consultation and was seen by Dr. Kc who recommended continued neoadjuvant chemo as patient was not resectable at this time.     Patient comes in today to finalize her neoajuavant treatment plan. She reports that she has begun to notice an increase in size in ulceration, oozing, discharge, pain and redness. She is currently taling Cephalexin 500mh po q8h and fl;agyl 500mg po q8h for the past 3 days with improvement in symptoms. These are abx prescribed by her previous oncologist that previously worked.      She denies any fever, chills or rigors.       Review of Systems:         A 14-point review of system was completed and was negative except for what is noted in HPI.        Allergies and Intolerances:       Allergies:        Allergies   Allergen Reactions    Morphine Anaphylaxis                 Outpatient Medication Profile:      Eliquis 5 mg BID  Ocycodone 10 po q4 prn     Medical History:  Pulmonary Emboli, breast cancer  Surgical History:  Right partial mastectomy        Family History:  See below     Social Substance History:   Social History               Socioeconomic History    Marital status: Significant Other       Spouse name: Not on file    Number of children: Not on file    Years of education: Not on file    Highest education level: Not on file   Occupational History    Not on file   Tobacco Use    Smoking status: Every Day       Average packs/day: 1 pack/day for 39.0 years (39.0 ttl pk-yrs)       Types: Cigarettes       Start date: 1985       Passive exposure: Current    Smokeless tobacco: Never  "  Substance and Sexual Activity    Alcohol use: Not on file    Drug use: Not on file    Sexual activity: Not on file   Other Topics Concern    Not on file   Social History Narrative    Not on file      Social Determinants of Health      Financial Resource Strain: Not on file   Food Insecurity: Not on file   Transportation Needs: Not on file   Physical Activity: Not on file   Stress: Not on file   Social Connections: Not on file   Intimate Partner Violence: Not on file   Housing Stability: Not on file         Additional Social History:     Breast Cancer Risk Factors:     Family History:  Mother had breast cancer at the age of 40, maternal grandmother at the age of 40 and paternal grandmother at the age of 60 years. Per records patient reported being a BRCA carrier but testing had been done with results pending.      OBJECTIVE:     Visit Vitals  /85   Pulse 78   Temp 36.2 °C (97.2 °F) (Temporal)   Resp 20   Ht 1.67 m (5' 5.75\")   Wt 81 kg (178 lb 9.2 oz)   SpO2 98%   BMI 29.04 kg/m²   Smoking Status Every Day   BSA 1.94 m²       Pain Scale: 3     The ECOG performance scale today is Asymptomatic     Physical Exam:      Constitutional: Well developed, awake/alert/oriented  x3, no distress, alert and cooperative   Eyes: PERRL, EOMI, clear sclera   ENMT: mucous membranes moist, no apparent injury,  no lesions seen   Head/Neck: Neck supple, no apparent injury, thyroid  without mass or tenderness, No JVD, trachea midline, no bruits   Respiratory/Thorax: Patent airways, CTAB, normal  breath sounds with good chest expansion, thorax symmetric   Cardiovascular: Regular, rate and rhythm, no murmurs,  2+ equal pulses of the extremities, normal S 1and S 2   Gastrointestinal: Nondistended, soft, non-tender,  no rebound tenderness or guarding, no masses palpable, no organomegaly, +BS, no bruits   Musculoskeletal: ROM intact, no joint swelling, normal  strength   Extremities: Left upper extremity with hyperpigmentation  " tracking along a vein starting in antecubital fossa   Neurological: alert and oriented x3, intact senses,  motor, response and reflexes, normal strength   Breast: Large ulcerative lesion with rolled edges measuring approximately 7 x 3 cm in right breast, no foul smelling, with erythema of rolled edges and mostly clear discharge. No palpable mass in the left breast. No palpable axillary or supraclavicular lymphadenopathies bilaterally. No swelling of either upper extremity which had free range of motion.   Lymphatic: No significant lymphadenopathy   Psychological: Appropriate mood and behavior   Skin: Warm and dry, no lesions, no rashes                      Assessment and Plan:   Assessment  Ms. Mariela De Guzman is a 59 y.o. postmenopausal  female with right-sided invasive ductal carcinoma, Triple Negative, clinical stage IIIC (cT4b cN0 M0).  The patient's breast cancer was diagnosed on May 25, 2023, and is grade 3, estrogen receptor negative at <1%, progesterone receptor negative at 1%, and HER-2/sumi negative, PDL-1 negative.     Patient was previously diagnosed and treated for ispilateral right sided her2-positive breast cancer, stage IIA (T2N0M0).      She was previously  under the care of Dr. Bartolome Marie, [facility],  and was undergoing neoadjuvant chemotherapy with Carboplatin and Gemcitabine, initiated on 12/28/2023 and, last received March 29, 2024.    She was first seen on 4/30/2024 for transfer of care. She requested a surgical consultation and was seen by Dr. Kc who recommended continued neoadjuvant chemo as patient was not resectable at this time.     Patient comes in today to finalize her neoajuavant treatment plan. She reports that she has begun to notice an increase in size in ulceration, oozing, discharge, pain and redness. She is currently taling Cephalexin 500mh po q8h and fl;agyl 500mg po q8h for the past 3 days with improvement in symptoms. These are abx prescribed by  her previous oncologist that previously worked.      She denies any fever, chills or rigors.      I recommend she resume Carbo [place] given that she had a favorable treatment response. I will obtain restaging to establish a new baseline prior to treatment.    Side effects that were discussed included but were not limited to myelosuppression, febrile neutropenia, nausea, vomiting diarrhea, dehydration,  acute hypersensitivity reaction, alopecia, peripheral neuropathy, liver and kidney toxicity, cardiotoxicity,  other malignancies and peripheral edema.     After much deliberation patient agreed to proceed with treatment. Informed consent was signed.            Plan     Resume neoadjuvant chemotherapy with Carboplatin AUC of 2 and Gemcitabine 1000mg/m2 , D1, D8 with D15 off every 21 days  CBC with diff, CMP and Pregnancy test, PT INR  Medi-port placement  Continue Cephalexin 500mg PO 8H and Flagyl 500mg PO 8H for CW ulceration. Patient declined wound care stating it did not help in the past  Palliative care in local hospital  Smoking cessation recommended. Patient declined  PET scan on 6/12  RTC 6/12 for first cycle of Carbo gem

## 2024-05-28 NOTE — PATIENT INSTRUCTIONS
Please stop and get your labs done TODAY.  Continue taking your antibiotics and call if symptoms worsen.  Please schedule your Port placement.  Please schedule your PET scan  Please schedule a follow up visit with us in 2 weeks.    Continue to follow up with palliative care at your [facility] hospital.    Please call us at [phone] option [phone] then option 2 with any questions or concerns.

## 2024-05-29 ENCOUNTER — TELEPHONE (OUTPATIENT)
Dept: HEMATOLOGY/ONCOLOGY | Facility: HOSPITAL | Age: 59
End: 2024-05-29
Payer: MEDICAID

## 2024-05-29 NOTE — TELEPHONE ENCOUNTER
Called patient 10 times over the last two days with no return call. Patient was in the office to see Dr. Barboza on 05/28/24 and left prior to chemo education and future appointments scheduled. Follow up appointment, port placement, PET scan, and infusion have all been scheduled and information mailed to the patient. Education assigned via [employer].

## 2024-06-04 ENCOUNTER — HOSPITAL ENCOUNTER (OUTPATIENT)
Dept: RADIOLOGY | Facility: HOSPITAL | Age: 59
Discharge: HOME | End: 2024-06-04
Payer: MEDICAID

## 2024-06-04 ENCOUNTER — OFFICE VISIT (OUTPATIENT)
Dept: HEMATOLOGY/ONCOLOGY | Facility: HOSPITAL | Age: 59
End: 2024-06-04
Payer: MEDICAID

## 2024-06-04 VITALS
TEMPERATURE: 96.8 F | WEIGHT: 171.52 LBS | HEART RATE: 98 BPM | HEIGHT: 66 IN | RESPIRATION RATE: 20 BRPM | BODY MASS INDEX: 27.57 KG/M2 | SYSTOLIC BLOOD PRESSURE: 112 MMHG | OXYGEN SATURATION: 95 % | DIASTOLIC BLOOD PRESSURE: 76 MMHG

## 2024-06-04 DIAGNOSIS — Z17.1 MALIGNANT NEOPLASM OF OVERLAPPING SITES OF RIGHT BREAST IN FEMALE, ESTROGEN RECEPTOR NEGATIVE (MULTI): ICD-10-CM

## 2024-06-04 DIAGNOSIS — Z17.1 MALIGNANT NEOPLASM OF OVERLAPPING SITES OF RIGHT BREAST IN FEMALE, ESTROGEN RECEPTOR NEGATIVE (MULTI): Primary | ICD-10-CM

## 2024-06-04 DIAGNOSIS — C50.811 MALIGNANT NEOPLASM OF OVERLAPPING SITES OF RIGHT BREAST IN FEMALE, ESTROGEN RECEPTOR NEGATIVE (MULTI): ICD-10-CM

## 2024-06-04 DIAGNOSIS — C50.919 MALIGNANT NEOPLASM OF BREAST IN FEMALE, ESTROGEN RECEPTOR NEGATIVE, UNSPECIFIED LATERALITY, UNSPECIFIED SITE OF BREAST (MULTI): ICD-10-CM

## 2024-06-04 DIAGNOSIS — Z17.1 MALIGNANT NEOPLASM OF BREAST IN FEMALE, ESTROGEN RECEPTOR NEGATIVE, UNSPECIFIED LATERALITY, UNSPECIFIED SITE OF BREAST (MULTI): ICD-10-CM

## 2024-06-04 DIAGNOSIS — C50.811 MALIGNANT NEOPLASM OF OVERLAPPING SITES OF RIGHT BREAST IN FEMALE, ESTROGEN RECEPTOR NEGATIVE (MULTI): Primary | ICD-10-CM

## 2024-06-04 PROCEDURE — 99215 OFFICE O/P EST HI 40 MIN: CPT | Performed by: INTERNAL MEDICINE

## 2024-06-04 ASSESSMENT — PAIN SCALES - GENERAL: PAINLEVEL: 8

## 2024-06-04 NOTE — PROGRESS NOTES
Patient Visit Information:   Date of Service: 4/30/2024   Referring Provider:  Dr. Bartolome Marie   Visit Type: New Visit      Cancer History:          Breast         AJCC Edition: 8th (AJCC), Diagnosis Date: 5/25/2022               Stage IIIC (cT4b, cN0, cM0, G3, ER-, CO-, HER2-)         Oncologic History and Treatment Synopsis:       59 y.o. postmenopausal  female with right-sided invasive ductal carcinoma, PDL-1 Negative Triple Negative, clinical stage IIIC (cT4b cN0 M0).  The patient's breast cancer was diagnosed on May 25, 2023, and is grade 3, estrogen receptor negative at <1%, progesterone receptor negative at 1%, and HER-2/sumi negative.     Patient was previously diagnosed and treated for ispilateral right sided her-positive breast cancer, stage IIA (T2N0M0).      Details of her oncologic history are below:      ONCOLOGIC HISTORY:  Malignant neoplasm of overlapping sites of right breast in female, estrogen receptor negative (Multi), Clinical: Stage IIIC (cT4b, cN0, cM0, G3, ER-, CO-, HER2-)  PDL-1 Negative with CPS Score =0     10/9/2017: Patient underwent a a right US-guided core biopsy. Pathology was consistent with invasive ductal carcinoma ER/CO negative Her2 undetermined  10/16/2017: Patient underwent a right partial mastectomy with SLNB. Pathology was consistent with invasive ductal carcinoma, grade 3 with invasive ductal carcinoma extending focally to the anterior inked  margin, DCIS was present, high grade, solid and comedo types, 0/2 lymph nodes negative, estrogen receptor <1%, progesterone receptor 1% and her2 (3+), stage pT2N0.  Received TCH x 6 but did not complete maintenance Herceptin   Received 13/33 planned radiation treatments  4/2022: Patient presented with right breast mass. Had apparently been present for several months prior  5/25/2022: Patient underwent a core needle biopsy of right breast. This revealed invasive, ductal carcinoma , ER/CO/HER2 negative  Developed  "ulceration (8 x 10 x 4cm) in right breast  03/07/2023: PET scan showed FDG uptake (SUV 14.5) in open wound, diffuse edema of right breast, no abnormal axillary or supraclavicular adenopathy,   3/15/2023: Initiated on Carbo/Taxol D1, D8, D15 with D22 off per 28 Day cycle. Last dose received 11/21/2023 11/4/2023: Patient underwent CT of the CAP. This revealed bilateral pulmonary emboli. There was a large necrotic mass in the right breast with skin thickening and inflammatory changes worse than on prior PET scan of 03/07/2023. There was fatty infiltration of the liver  11/21/2023: Last dose of Carbo/Taxol  12/28/2023: Patient was initiated on Carboplatin and Gemcitabine, D1, D8 with D15 off.   02/03/2024: PDL-1 test resulted with CPS Score =0  2/22/2024: PET scan showed abnirmal uptake in a large mass in the lateral aspect of the right breast with extension into the overlying muscle (SUV 3.1; Previously SUV was 14.3). No abnormal supraclavicular or axillary Lymph nodes  3/27/2024: Bilateral MRI of Breast. Per report this revealed a 3.5 cm large necrotic mass with large open wound. Thickened enhancing skin most likely  with malignant infiltration and retracted right pectoralis muscle  with malignant extension. There were a few abnormal 1-2 cm right axillary Lns and an abnormal right internal mammary lymph node. Left breast did not reveal any evidence of malignancy     History of Present Illness: Patient ID: Mariela De Guzman \"Mona\" is a 59 y.o. female.        Chief Complaint: \"CW wound with worsening infection\"        Interval History:    Ms. Mariela De Guzman is a 59 y.o. postmenopausal  female with locally advanced right-sided invasive ductal carcinoma with a fungating ulcer, Triple Negative in nature, clinical stage IIIC (cT4b cN0 M0).  The patient's breast cancer was diagnosed on May 25, 2023, and is grade 3, estrogen receptor negative at <1%, progesterone receptor negative at 1%, and HER-2/sumi negative, PDL-1 " negative.     She was previously  under the care of Dr. Bartolome Marie, [facility],  and was undergoing neoadjuvant chemotherapy with Carboplatin and Gemcitabine, initiated on 12/28/2023 and, last received March 29, 2024.     She was first seen on 4/30/2024 for transfer of care. She requested a surgical consultation and was seen by Dr. Kc who recommended continued neoadjuvant chemo as patient was not resectable at this time.      Patient was last seen in the clinic a week ago with a plan to resume chemotherapy with [place].Carbo  on 6/12 and mediport placement today. She was also to continue on oral abx for her fungating CW wound. However, upon showing up today at [facility], she was noted to have worsening CE/Fungating mass infection and mediport placement was cancelled. Patient then walked into the clinic asking to be seen.      She denies any fever, chills or rigors.       Review of Systems:         A 14-point review of system was completed and was negative except for what is noted in HPI.        Allergies and Intolerances:       Allergies:           Allergies   Allergen Reactions    Morphine Anaphylaxis                 Outpatient Medication Profile:      Eliquis 5 mg BID  Ocycodone 10 po q4 prn     Medical History:  Pulmonary Emboli, breast cancer  Surgical History:  Right partial mastectomy        Family History:  See below     Social Substance History:   Social History                   Socioeconomic History    Marital status: Significant Other       Spouse name: Not on file    Number of children: Not on file    Years of education: Not on file    Highest education level: Not on file   Occupational History    Not on file   Tobacco Use    Smoking status: Every Day       Average packs/day: 1 pack/day for 39.0 years (39.0 ttl pk-yrs)       Types: Cigarettes       Start date: 1985       Passive exposure: Current    Smokeless tobacco: Never   Substance and Sexual Activity    Alcohol use: Not on file     "Drug use: Not on file    Sexual activity: Not on file   Other Topics Concern    Not on file   Social History Narrative    Not on file      Social Determinants of Health      Financial Resource Strain: Not on file   Food Insecurity: Not on file   Transportation Needs: Not on file   Physical Activity: Not on file   Stress: Not on file   Social Connections: Not on file   Intimate Partner Violence: Not on file   Housing Stability: Not on file         Additional Social History:     Breast Cancer Risk Factors:     Family History:  Mother had breast cancer at the age of 40, maternal grandmother at the age of 40 and paternal grandmother at the age of 60 years. Per records patient reported being a BRCA carrier but testing had been done with results pending.      OBJECTIVE:     Visit Vitals  /85   Pulse 78   Temp 36.2 °C (97.2 °F) (Temporal)   Resp 20   Ht 1.67 m (5' 5.75\")   Wt 81 kg (178 lb 9.2 oz)   SpO2 98%   BMI 29.04 kg/m²   Smoking Status Every Day   BSA 1.94 m²       Pain Scale: 3     The ECOG performance scale today is Asymptomatic     Physical Exam:      Constitutional: Well developed, awake/alert/oriented  x3, no distress, alert and cooperative   Eyes: PERRL, EOMI, clear sclera   ENMT: mucous membranes moist, no apparent injury,  no lesions seen   Head/Neck: Neck supple, no apparent injury, thyroid  without mass or tenderness, No JVD, trachea midline, no bruits   Respiratory/Thorax: Patent airways, CTAB, normal  breath sounds with good chest expansion, thorax symmetric   Cardiovascular: Regular, rate and rhythm, no murmurs,  2+ equal pulses of the extremities, normal S 1and S 2   Gastrointestinal: Nondistended, soft, non-tender,  no rebound tenderness or guarding, no masses palpable, no organomegaly, +BS, no bruits   Musculoskeletal: ROM intact, no joint swelling, normal  strength   Extremities: Left upper extremity with hyperpigmentation  tracking along a vein starting in antecubital fossa   Neurological: " alert and oriented x3, intact senses,  motor, response and reflexes, normal strength   Breast: Large ulcerative lesion with rolled edges measuring approximately 7 x 3 cm in right breast, with copious thick creamy white discharge, with erythema of rolled edges and surrounding skin erythema. Extension of cancerous lesion in wound bed noted compared to previous exam. No palpable mass in the left breast. No palpable axillary or supraclavicular lymphadenopathies bilaterally. No swelling of either upper extremity which had free range of motion.   Lymphatic: No significant lymphadenopathy   Psychological: Appropriate mood and behavior   Skin: Warm and dry, no lesions, no rashes                      Assessment and Plan:   Assessment  Ms. Mariela De Guzman is a 59 y.o. postmenopausal  female with right-sided invasive ductal carcinoma, Triple Negative, clinical stage IIIC (cT4b cN0 M0).  The patient's breast cancer was diagnosed on May 25, 2023, and is grade 3, estrogen receptor negative at <1%, progesterone receptor negative at 1%, and HER-2/sumi negative, PDL-1 negative.     Patient was previously diagnosed and treated for ispilateral right sided her2-positive breast cancer, stage IIA (T2N0M0).      More recently care of Dr. Bartolome Marie, [facility],  and was undergoing neoadjuvant chemotherapy with Carboplatin and Gemcitabine, initiated on 12/28/2023 and, last received March 29, 2024.     She was first seen on 4/30/2024 for transfer of care. She requested a surgical consultation and was seen by Dr. Kc who recommended continued neoadjuvant chemo as patient was not resectable at this time.      Patient was last seen in the clinic a week ago with a plan to resume chemotherapy with [place]/Carbo  on 6/12 and mediport placement today. She was also to continue on oral abx for her fungating CW wound. However, upon showing up today at IR at [facility], she was noted to have worsening CW/Fungating mass infection and  mediport placement was cancelled. Patient then walked into the clinic asking to be seen.      She denies any fever, chills or rigors.  No evidence of systemic infection noted.               Plan     Hold plan to resume neoadjuvant chemotherapy with Carboplatin AUC of 2 and Gemcitabine 1000mg/m2 , D1, D8 with D15 off every 21 days  Referral to ED for cellulitis of right CW in the setting of a fungating Ulcer. Will need IV abx.  [place] [facility]  CBC with diff, CMP and Pregnancy test, PT INR pending prior to chemo  Medi-port placement on hold. Seen in IR. MD declined due to skin infection. Will consider temporary line placement for treatment after completion of IV abx  Palliative care in local hospital  Smoking cessation recommended. Patient declined  PET scan on 6/12  Will reschedule chemotherapy after IV abx and temporary line is placed